# Patient Record
Sex: FEMALE | Race: WHITE
[De-identification: names, ages, dates, MRNs, and addresses within clinical notes are randomized per-mention and may not be internally consistent; named-entity substitution may affect disease eponyms.]

---

## 2019-07-01 ENCOUNTER — HOSPITAL ENCOUNTER (OUTPATIENT)
Dept: HOSPITAL 33 - OB | Age: 29
Setting detail: OBSERVATION
LOS: 1 days | Discharge: HOME | End: 2019-07-02
Attending: FAMILY MEDICINE | Admitting: FAMILY MEDICINE
Payer: COMMERCIAL

## 2019-07-01 DIAGNOSIS — Z34.83: Primary | ICD-10-CM

## 2019-07-01 LAB
AMPHETAMINES UR QL: NEGATIVE
BARBITURATES UR QL: NEGATIVE
BENZODIAZ UR QL SCN: NEGATIVE
COCAINE UR QL SCN: NEGATIVE
GLUCOSE UR-MCNC: NEGATIVE MG/DL
METHADONE UR QL: NEGATIVE
OPIATES UR QL: NEGATIVE
PCP UR QL CFM>20 NG/ML: NEGATIVE
PROT UR STRIP-MCNC: 100 MG/DL
RBC #/AREA URNS HPF: (no result) /HPF (ref 0–2)
THC UR QL SCN: NEGATIVE
WBC #/AREA URNS HPF: >100 /HPF (ref 0–5)

## 2019-07-01 PROCEDURE — 80307 DRUG TEST PRSMV CHEM ANLYZR: CPT

## 2019-07-01 PROCEDURE — G0378 HOSPITAL OBSERVATION PER HR: HCPCS

## 2019-07-01 PROCEDURE — 87077 CULTURE AEROBIC IDENTIFY: CPT

## 2019-07-01 PROCEDURE — 87186 SC STD MICRODIL/AGAR DIL: CPT

## 2019-07-01 PROCEDURE — 81001 URINALYSIS AUTO W/SCOPE: CPT

## 2019-07-01 PROCEDURE — 87086 URINE CULTURE/COLONY COUNT: CPT

## 2019-07-02 VITALS — DIASTOLIC BLOOD PRESSURE: 67 MMHG | HEART RATE: 86 BPM | SYSTOLIC BLOOD PRESSURE: 104 MMHG

## 2019-08-08 ENCOUNTER — HOSPITAL ENCOUNTER (OUTPATIENT)
Dept: HOSPITAL 33 - OB | Age: 29
Setting detail: OBSERVATION
Discharge: HOME | End: 2019-08-08
Attending: FAMILY MEDICINE | Admitting: FAMILY MEDICINE
Payer: COMMERCIAL

## 2019-08-08 VITALS — OXYGEN SATURATION: 99 % | HEART RATE: 103 BPM

## 2019-08-08 VITALS — SYSTOLIC BLOOD PRESSURE: 125 MMHG | DIASTOLIC BLOOD PRESSURE: 76 MMHG

## 2019-08-08 DIAGNOSIS — Z34.83: Primary | ICD-10-CM

## 2019-08-08 LAB
GLUCOSE UR-MCNC: NEGATIVE MG/DL
PROT UR STRIP-MCNC: 30 MG/DL
RBC #/AREA URNS HPF: (no result) /HPF (ref 0–2)
WBC #/AREA URNS HPF: (no result) /HPF (ref 0–5)

## 2019-08-08 PROCEDURE — 81001 URINALYSIS AUTO W/SCOPE: CPT

## 2019-08-08 PROCEDURE — G0378 HOSPITAL OBSERVATION PER HR: HCPCS

## 2019-08-12 ENCOUNTER — HOSPITAL ENCOUNTER (OUTPATIENT)
Dept: HOSPITAL 33 - OB | Age: 29
Setting detail: OBSERVATION
Discharge: HOME | End: 2019-08-12
Attending: FAMILY MEDICINE | Admitting: FAMILY MEDICINE
Payer: COMMERCIAL

## 2019-08-12 VITALS — SYSTOLIC BLOOD PRESSURE: 118 MMHG | HEART RATE: 81 BPM | DIASTOLIC BLOOD PRESSURE: 77 MMHG

## 2019-08-12 VITALS — OXYGEN SATURATION: 98 %

## 2019-08-12 DIAGNOSIS — Z34.83: Primary | ICD-10-CM

## 2019-08-12 PROCEDURE — G0378 HOSPITAL OBSERVATION PER HR: HCPCS

## 2019-08-12 PROCEDURE — 83986 ASSAY PH BODY FLUID NOS: CPT

## 2019-08-21 ENCOUNTER — HOSPITAL ENCOUNTER (OUTPATIENT)
Dept: HOSPITAL 33 - OB | Age: 29
Setting detail: OBSERVATION
Discharge: HOME | End: 2019-08-21
Attending: FAMILY MEDICINE | Admitting: FAMILY MEDICINE
Payer: COMMERCIAL

## 2019-08-21 VITALS — HEART RATE: 85 BPM

## 2019-08-21 VITALS — DIASTOLIC BLOOD PRESSURE: 84 MMHG | SYSTOLIC BLOOD PRESSURE: 121 MMHG

## 2019-08-21 DIAGNOSIS — Z34.83: Primary | ICD-10-CM

## 2019-08-21 LAB
GLUCOSE UR-MCNC: NEGATIVE MG/DL
PROT UR STRIP-MCNC: NEGATIVE MG/DL
RBC #/AREA URNS HPF: (no result) /HPF (ref 0–2)
WBC #/AREA URNS HPF: (no result) /HPF (ref 0–5)

## 2019-08-21 PROCEDURE — 81001 URINALYSIS AUTO W/SCOPE: CPT

## 2019-08-21 PROCEDURE — G0378 HOSPITAL OBSERVATION PER HR: HCPCS

## 2019-09-06 ENCOUNTER — HOSPITAL ENCOUNTER (INPATIENT)
Dept: HOSPITAL 33 - OB | Age: 29
LOS: 2 days | Discharge: HOME | End: 2019-09-08
Attending: FAMILY MEDICINE | Admitting: FAMILY MEDICINE
Payer: COMMERCIAL

## 2019-09-06 DIAGNOSIS — Z3A.39: ICD-10-CM

## 2019-09-06 LAB
AMPHETAMINES UR QL: NEGATIVE
BARBITURATES UR QL: NEGATIVE
BASOPHILS # BLD AUTO: 0.03 10*3/UL (ref 0–0.4)
BASOPHILS NFR BLD AUTO: 0.4 % (ref 0–0.4)
BENZODIAZ UR QL SCN: NEGATIVE
COCAINE UR QL SCN: NEGATIVE
EOSINOPHIL # BLD AUTO: 0.08 10*3/UL (ref 0–0.5)
GLUCOSE UR-MCNC: NEGATIVE MG/DL
GRANULOCYTES # BLD AUTO: 5.05 10*3/UL (ref 1.4–6.9)
HCT VFR BLD AUTO: 34.4 % (ref 35–47)
HGB BLD-MCNC: 11 GM/DL (ref 12–16)
LYMPHOCYTES # SPEC AUTO: 1.47 10*3/UL (ref 1–4.6)
MCH RBC QN AUTO: 25.2 PG (ref 26–32)
MCHC RBC AUTO-ENTMCNC: 32 G/DL (ref 32–36)
METHADONE UR QL: NEGATIVE
MONOCYTES # BLD AUTO: 0.5 10*3/UL (ref 0–1.3)
NEUTROPHILS NFR BLD AUTO: 70.9 % (ref 36–66)
OPIATES UR QL: NEGATIVE
PCP UR QL CFM>20 NG/ML: NEGATIVE
PLATELET # BLD AUTO: 197 K/MM3 (ref 150–450)
PROT UR STRIP-MCNC: NEGATIVE MG/DL
RBC # BLD AUTO: 4.36 M/MM3 (ref 4.1–5.4)
RBC #/AREA URNS HPF: (no result) /HPF (ref 0–2)
THC UR QL SCN: NEGATIVE
WBC # BLD AUTO: 7.1 K/MM3 (ref 4–10.5)
WBC #/AREA URNS HPF: (no result) /HPF (ref 0–5)

## 2019-09-06 PROCEDURE — 85025 COMPLETE CBC W/AUTO DIFF WBC: CPT

## 2019-09-06 PROCEDURE — 36415 COLL VENOUS BLD VENIPUNCTURE: CPT

## 2019-09-06 PROCEDURE — 81001 URINALYSIS AUTO W/SCOPE: CPT

## 2019-09-06 PROCEDURE — G0378 HOSPITAL OBSERVATION PER HR: HCPCS

## 2019-09-06 PROCEDURE — 87340 HEPATITIS B SURFACE AG IA: CPT

## 2019-09-06 PROCEDURE — 80307 DRUG TEST PRSMV CHEM ANLYZR: CPT

## 2019-09-06 RX ADMIN — THERA TABS SCH TAB: TAB at 21:39

## 2019-09-06 RX ADMIN — LEVOTHYROXINE SODIUM SCH: 100 TABLET ORAL at 21:33

## 2019-09-06 RX ADMIN — DOCUSATE SODIUM SCH MG: 100 CAPSULE, LIQUID FILLED ORAL at 21:39

## 2019-09-06 RX ADMIN — LEVOTHYROXINE SODIUM SCH: 75 TABLET ORAL at 21:33

## 2019-09-07 LAB
BASOPHILS # BLD AUTO: 0.01 10*3/UL (ref 0–0.4)
BASOPHILS NFR BLD AUTO: 0.1 % (ref 0–0.4)
EOSINOPHIL # BLD AUTO: 0.1 10*3/UL (ref 0–0.5)
GRANULOCYTES # BLD AUTO: 5.34 10*3/UL (ref 1.4–6.9)
HCT VFR BLD AUTO: 32.9 % (ref 35–47)
HGB BLD-MCNC: 10.4 GM/DL (ref 12–16)
LYMPHOCYTES # SPEC AUTO: 1.45 10*3/UL (ref 1–4.6)
MCH RBC QN AUTO: 25.2 PG (ref 26–32)
MCHC RBC AUTO-ENTMCNC: 31.6 G/DL (ref 32–36)
MONOCYTES # BLD AUTO: 0.49 10*3/UL (ref 0–1.3)
NEUTROPHILS NFR BLD AUTO: 72.3 % (ref 36–66)
PLATELET # BLD AUTO: 185 K/MM3 (ref 150–450)
RBC # BLD AUTO: 4.12 M/MM3 (ref 4.1–5.4)
WBC # BLD AUTO: 7.4 K/MM3 (ref 4–10.5)

## 2019-09-07 RX ADMIN — HYDROCODONE BITARTRATE AND ACETAMINOPHEN PRN TAB: 5; 325 TABLET ORAL at 18:59

## 2019-09-07 RX ADMIN — DOCUSATE SODIUM SCH MG: 100 CAPSULE, LIQUID FILLED ORAL at 10:45

## 2019-09-07 RX ADMIN — HYDROCODONE BITARTRATE AND ACETAMINOPHEN PRN TAB: 5; 325 TABLET ORAL at 10:43

## 2019-09-07 RX ADMIN — HYDROCODONE BITARTRATE AND ACETAMINOPHEN PRN TAB: 5; 325 TABLET ORAL at 05:55

## 2019-09-07 RX ADMIN — LEVOTHYROXINE SODIUM SCH MCG: 75 TABLET ORAL at 10:46

## 2019-09-07 RX ADMIN — LEVOTHYROXINE SODIUM SCH MCG: 100 TABLET ORAL at 10:46

## 2019-09-07 RX ADMIN — HYDROCODONE BITARTRATE AND ACETAMINOPHEN PRN TAB: 5; 325 TABLET ORAL at 15:08

## 2019-09-07 RX ADMIN — THERA TABS SCH TAB: TAB at 10:44

## 2019-09-07 RX ADMIN — Medication SCH MG: at 10:45

## 2019-09-07 RX ADMIN — HYDROCODONE BITARTRATE AND ACETAMINOPHEN PRN TAB: 5; 325 TABLET ORAL at 22:46

## 2019-09-07 RX ADMIN — IBUPROFEN PRN MG: 400 TABLET ORAL at 00:04

## 2019-09-07 RX ADMIN — HYDROCODONE BITARTRATE AND ACETAMINOPHEN PRN TAB: 5; 325 TABLET ORAL at 01:28

## 2019-09-07 RX ADMIN — DOCUSATE SODIUM SCH MG: 100 CAPSULE, LIQUID FILLED ORAL at 20:57

## 2019-09-08 VITALS — DIASTOLIC BLOOD PRESSURE: 64 MMHG | SYSTOLIC BLOOD PRESSURE: 112 MMHG | HEART RATE: 81 BPM

## 2019-09-08 RX ADMIN — HYDROCODONE BITARTRATE AND ACETAMINOPHEN PRN TAB: 5; 325 TABLET ORAL at 06:56

## 2019-09-08 RX ADMIN — LEVOTHYROXINE SODIUM SCH MCG: 75 TABLET ORAL at 09:39

## 2019-09-08 RX ADMIN — Medication SCH MG: at 09:39

## 2019-09-08 RX ADMIN — HYDROCODONE BITARTRATE AND ACETAMINOPHEN PRN TAB: 5; 325 TABLET ORAL at 03:03

## 2019-09-08 RX ADMIN — THERA TABS SCH TAB: TAB at 09:39

## 2019-09-08 RX ADMIN — DOCUSATE SODIUM SCH MG: 100 CAPSULE, LIQUID FILLED ORAL at 09:39

## 2019-09-08 RX ADMIN — IBUPROFEN PRN MG: 400 TABLET ORAL at 13:05

## 2019-09-08 RX ADMIN — LEVOTHYROXINE SODIUM SCH MCG: 100 TABLET ORAL at 09:39

## 2019-09-08 NOTE — PCM.DS
Discharge Summary


Date of Admission: 


19 08:46





Date of Discharge: 


2019





Admitting Physician: 


JIMMIE PRICE





Consults: 





 Consults on Case





19 17:03


Notify Physician ROUTINE 











Primary Care Provider: 


JIMMIE PRICE








Allergies


Allergies





cefprozil [From Cefzil] Allergy (Severe, Verified 19 05:36)


 Swelling of Face


coffee (Coffea arabica) [coffee] Allergy (Intermediate, Verified 19 05:37)


 [facility] Summary





- Hospital Course


Hospital Course: 


29 yr old  at 39 and 4/7 weeks ega that was admitted for elective 

induction of labor. Patient's PMHx includes hypothyroidism. Patient is s/p day 

two  of viable male infant. Perineum intact. Loose nuchal cord x1 easily 

reduced. Infant was bulb suctioned. Cord clamped then cut by FOB. Placenta 

delivered  by active management. Cord blood collective and sent to lab. Uterus 

contracted down and fundus firm.  ml. No complications noted. Patient 

has continued to do well during hospital course. She has had stable VS. She 

plans to bottle feed due to reported family hx of lactose intolerance. 








- Vitals & Intake/Output


Vital Signs: 





 Vital Signs











Temperature  98.2 F   19 08:00


 


Pulse Rate  77   19 08:00


 


Respiratory Rate  18   19 08:00


 


Blood Pressure  109/70   19 08:00


 


O2 Sat by Pulse Oximetry      











Intake & Output: 





 Intake & Output











 19





 11:59 11:59 11:59 11:59


 


Intake Total  1000 500 


 


Output Total 350 5  


 


Balance -350 995 500 


 


Weight 104.326 kg   














- Lab


Result Diagrams: 


 19 05:15








Discharge Exam


General Appearance: no apparent distress


Neurologic Exam: alert, oriented x 3, cooperative, normal mood/affect


Eye Exam: eyes nml inspection


Ears, Nose, Throat Exam: moist mucous membranes


Neck Exam: normal inspection


Respiratory Exam: normal breath sounds


Cardiovascular Exam: regular rate/rhythm, normal heart sounds


Gastrointestinal/Abdomen Exam: soft, normal bowel sounds


Pelvic Exam: other (Uterine fundus below level of umbilicus appropriately tender

)


Back Exam: normal inspection, other (small bruise at epidural site tender to 

palpation)


Extremity Exam: pedal edema (+2)


Skin Exam: normal color, warm, dry





Final Diagnosis/Problem List





- Final Discharge Diagnosis/Problem


(1) Vaginal delivery


Current Visit: No   Status: Acute   


Assessment & Plan: 


Patient is doing well. VS stable. Plan for discharge today








- Discharge


Discharge Date: 19


Disposition: Home, Self-Care


Condition: Stable


Prescriptions: 


No Action


   Prenatal Vits W-Ca,Fe,FA(<1Mg) [Prenatal] 1 each PO DAILY


   Levothyroxine Sodium [Synthroid] 275 mcg PO DAILY


   Ergocalciferol (Vitamin D2) [Vitamin D2] 50,000 unit PO Q7D


Follow up with: 


JIMMIE PRICE MD [Primary Care Provider] - 1 Week

## 2021-04-07 ENCOUNTER — HOSPITAL ENCOUNTER (OUTPATIENT)
Dept: HOSPITAL 33 - SDC | Age: 31
Discharge: HOME | End: 2021-04-07
Attending: FAMILY MEDICINE
Payer: COMMERCIAL

## 2021-04-07 VITALS — SYSTOLIC BLOOD PRESSURE: 126 MMHG | HEART RATE: 68 BPM | OXYGEN SATURATION: 99 % | DIASTOLIC BLOOD PRESSURE: 71 MMHG

## 2021-04-07 DIAGNOSIS — O02.1: Primary | ICD-10-CM

## 2021-04-07 LAB
ABO GROUP BLD: (no result)
HCT VFR BLD AUTO: 39.4 % (ref 35–47)
HGB BLD-MCNC: 12.5 GM/DL (ref 12–16)
RH BLD: POSITIVE

## 2021-04-07 PROCEDURE — 86850 RBC ANTIBODY SCREEN: CPT

## 2021-04-07 PROCEDURE — 36415 COLL VENOUS BLD VENIPUNCTURE: CPT

## 2021-04-07 PROCEDURE — 88305 TISSUE EXAM BY PATHOLOGIST: CPT

## 2021-04-07 PROCEDURE — 85014 HEMATOCRIT: CPT

## 2021-04-07 PROCEDURE — 86900 BLOOD TYPING SEROLOGIC ABO: CPT

## 2021-04-07 PROCEDURE — 85018 HEMOGLOBIN: CPT

## 2021-04-07 PROCEDURE — 86901 BLOOD TYPING SEROLOGIC RH(D): CPT

## 2021-04-07 NOTE — OP
SURGERY DATE/TIME:   2021  0755



PREOPERATIVE DIAGNOSIS:    Missed . 



POSTOPERATIVE DIAGNOSIS:  Missed . 



PROCEDURE:    Suction dilatation and curettage. 



SURGEON:      Goldy Negron M.D.



ANESTHESIA:  General by Apolinar Gates CRNA. 



ESTIMATED BLOOD LOSS:  Approximately 100 cc.



SPECIMEN:  Products of conception. 



DESCRIPTION OF PROCEDURE:  After informed, written consent was obtained, the patient was 
taken to the operating room. She was prepped and draped in dorsal lithotomy position after 
she underwent general anesthesia. A weighted speculum was inserted into the vagina vault 
and the anterior free edge of the cervix was grasped with a single tooth tenaculum. Sound 
was used to gauge sound depth and then Hegar dilators were used to approximate dilation to 
receive a 7 Japanese suction catheter.  A 7 Japanese suction catheter was used to evacuate the 
uterine cavity. There was tan tissue consistent with products of conception visualized 
during suction with some bleeding consistent with completion of the procedure. Several 
passes were used in all four quadrants and felt as though the endometrial gritty texture 
was appreciable following removal of the products of conception in all four quadrants with 
minimal bleeding following evacuation of the uterine cavity. The patient remained stable 
throughout the procedure. The single tooth tenaculum was removed. Sponge stick was used to 
clean the vaginal vault. Minimal bleeding was present following the procedure. The 
weighted speculum was removed. The patient was transferred to the recovery in good 
condition. She was advised to follow up in the office in one week and I also advised the 
patient and her mother before and after the procedure to call if any fever, severe pelvic 
pain, heavy bleeding with clot passage or other complaints or concerns prior to the one 
week martínez.

## 2021-04-07 NOTE — PCM.DCORD
- Discharge


Disposition: Home, Self-Care


Condition: Stable


Prescriptions: 


New


   Hydrocodone/Acetaminophen [Hydrocodone-Acetamin 5-325 mg ***] 1 tab PO Q6HPRN

PRN 5 Days #20 tablet MDD 4


     PRN Reason: Pain


   Cephalexin Mh 500 mg** [Keflex 500 mg**] 500 mg PO QID #28 capsule





Continue


   Prenatal Vits W-Ca,Fe,FA(<1Mg) [Prenatal] 1 each PO DAILY


   Levothyroxine Sodium [Synthroid] 225 mcg PO DAILY


   Ergocalciferol (Vitamin D2) [Vitamin D2] 50,000 unit PO Q7D


Follow up with: 


JIMMIE PRICE MD [Primary Care Provider] - 1 Week

## 2023-06-14 ENCOUNTER — HOSPITAL ENCOUNTER (OUTPATIENT)
Dept: HOSPITAL 33 - OB | Age: 33
Setting detail: OBSERVATION
Discharge: HOME | End: 2023-06-14
Attending: FAMILY MEDICINE | Admitting: FAMILY MEDICINE
Payer: COMMERCIAL

## 2023-06-14 VITALS — OXYGEN SATURATION: 98 %

## 2023-06-14 VITALS — HEART RATE: 82 BPM | SYSTOLIC BLOOD PRESSURE: 107 MMHG | DIASTOLIC BLOOD PRESSURE: 61 MMHG

## 2023-06-14 DIAGNOSIS — Z3A.34: ICD-10-CM

## 2023-06-14 DIAGNOSIS — Z34.83: Primary | ICD-10-CM

## 2023-06-14 LAB
AMPHETAMINES UR QL: NEGATIVE
BACTERIA UR CULT: NO
BARBITURATES UR QL: NEGATIVE
BENZODIAZ UR QL SCN: NEGATIVE
COCAINE UR QL SCN: NEGATIVE
METHADONE UR QL: NEGATIVE
OPIATES UR QL: NEGATIVE
PCP UR QL CFM>20 NG/ML: NEGATIVE
RBC # URNS HPF: (no result) /HPF (ref 0–5)
THC UR QL SCN: NEGATIVE
WBC URNS QL MICRO: (no result) /HPF (ref 0–5)

## 2023-06-14 PROCEDURE — 81001 URINALYSIS AUTO W/SCOPE: CPT

## 2023-06-14 PROCEDURE — G0379 DIRECT REFER HOSPITAL OBSERV: HCPCS

## 2023-06-14 PROCEDURE — 80307 DRUG TEST PRSMV CHEM ANLYZR: CPT

## 2023-06-14 PROCEDURE — G0378 HOSPITAL OBSERVATION PER HR: HCPCS

## 2023-07-03 ENCOUNTER — HOSPITAL ENCOUNTER (INPATIENT)
Dept: HOSPITAL 33 - CLIN-LAKE | Age: 33
LOS: 3 days | Discharge: HOME | End: 2023-07-06
Attending: FAMILY MEDICINE | Admitting: FAMILY MEDICINE
Payer: COMMERCIAL

## 2023-07-03 DIAGNOSIS — Z20.828: ICD-10-CM

## 2023-07-03 DIAGNOSIS — K83.1: ICD-10-CM

## 2023-07-03 DIAGNOSIS — Z3A.37: ICD-10-CM

## 2023-07-03 DIAGNOSIS — O26.613: Primary | ICD-10-CM

## 2023-07-03 LAB
ABO GROUP BLD: (no result)
AMPHETAMINES UR QL: NEGATIVE
BARBITURATES UR QL: NEGATIVE
BENZODIAZ UR QL SCN: NEGATIVE
COCAINE UR QL SCN: NEGATIVE
GLUCOSE UR-MCNC: NEGATIVE MG/DL
HCT VFR BLD AUTO: 30.2 % (ref 35–47)
HGB BLD-MCNC: 9.2 G/DL (ref 12–16)
MCH RBC QN AUTO: 23.2 PG (ref 26–32)
MCHC RBC AUTO-ENTMCNC: 30.5 G/DL (ref 32–36)
METHADONE UR QL: NEGATIVE
OPIATES UR QL: NEGATIVE
PCP UR QL CFM>20 NG/ML: NEGATIVE
PLATELET # BLD AUTO: 179 X10^3/UL (ref 150–450)
PROT UR STRIP-MCNC: 30 MG/DL
RBC # BLD AUTO: 3.96 X10^6/UL (ref 4.1–5.4)
RBC # UR AUTO: NEGATIVE ERY/UL (ref 0–5)
RH BLD: POSITIVE
THC UR QL SCN: NEGATIVE
WBC # BLD AUTO: 4.8 X10^3/UL (ref 4–10.5)

## 2023-07-03 PROCEDURE — 85027 COMPLETE CBC AUTOMATED: CPT

## 2023-07-03 PROCEDURE — 86900 BLOOD TYPING SEROLOGIC ABO: CPT

## 2023-07-03 PROCEDURE — 90715 TDAP VACCINE 7 YRS/> IM: CPT

## 2023-07-03 PROCEDURE — 86901 BLOOD TYPING SEROLOGIC RH(D): CPT

## 2023-07-03 PROCEDURE — 86850 RBC ANTIBODY SCREEN: CPT

## 2023-07-03 PROCEDURE — 85025 COMPLETE CBC W/AUTO DIFF WBC: CPT

## 2023-07-03 PROCEDURE — 36415 COLL VENOUS BLD VENIPUNCTURE: CPT

## 2023-07-03 PROCEDURE — 81003 URINALYSIS AUTO W/O SCOPE: CPT

## 2023-07-03 PROCEDURE — 96372 THER/PROPH/DIAG INJ SC/IM: CPT

## 2023-07-03 PROCEDURE — G0378 HOSPITAL OBSERVATION PER HR: HCPCS

## 2023-07-03 PROCEDURE — 80053 COMPREHEN METABOLIC PANEL: CPT

## 2023-07-03 PROCEDURE — 80307 DRUG TEST PRSMV CHEM ANLYZR: CPT

## 2023-07-03 RX ADMIN — Medication SCH MLS/HR: at 16:27

## 2023-07-04 RX ADMIN — IBUPROFEN PRN MG: 400 TABLET ORAL at 16:36

## 2023-07-04 RX ADMIN — ACETAMINOPHEN PRN MG: 500 TABLET ORAL at 22:33

## 2023-07-04 RX ADMIN — Medication SCH: at 11:09

## 2023-07-04 RX ADMIN — Medication SCH MLS/HR: at 09:36

## 2023-07-04 RX ADMIN — DOCUSATE SODIUM SCH MG: 100 CAPSULE, LIQUID FILLED ORAL at 23:20

## 2023-07-05 LAB
HCT VFR BLD AUTO: 26.6 % (ref 35–47)
HGB BLD-MCNC: 8.3 G/DL (ref 12–16)
MCH RBC QN AUTO: 23.6 PG (ref 26–32)
MCHC RBC AUTO-ENTMCNC: 31.2 G/DL (ref 32–36)
PLATELET # BLD AUTO: 191 X10^3/UL (ref 150–450)
RBC # BLD AUTO: 3.51 X10^6/UL (ref 4.1–5.4)
WBC # BLD AUTO: 7 X10^3/UL (ref 4–10.5)

## 2023-07-05 RX ADMIN — Medication SCH MG: at 09:42

## 2023-07-05 RX ADMIN — ACETAMINOPHEN PRN MG: 500 TABLET ORAL at 20:00

## 2023-07-05 RX ADMIN — ACETAMINOPHEN PRN MG: 500 TABLET ORAL at 13:19

## 2023-07-05 RX ADMIN — DOCUSATE SODIUM SCH MG: 100 CAPSULE, LIQUID FILLED ORAL at 21:22

## 2023-07-05 RX ADMIN — LEVOTHYROXINE SODIUM SCH MCG: 125 TABLET ORAL at 11:32

## 2023-07-05 RX ADMIN — IBUPROFEN PRN MG: 400 TABLET ORAL at 09:39

## 2023-07-05 RX ADMIN — LEVOTHYROXINE SODIUM SCH MCG: 150 TABLET ORAL at 11:32

## 2023-07-05 RX ADMIN — THERA TABS SCH TAB: TAB at 13:22

## 2023-07-05 RX ADMIN — IBUPROFEN PRN MG: 400 TABLET ORAL at 15:40

## 2023-07-05 RX ADMIN — IBUPROFEN PRN MG: 400 TABLET ORAL at 21:40

## 2023-07-05 RX ADMIN — IBUPROFEN PRN MG: 400 TABLET ORAL at 01:08

## 2023-07-05 RX ADMIN — ACETAMINOPHEN PRN MG: 500 TABLET ORAL at 04:02

## 2023-07-05 RX ADMIN — DOCUSATE SODIUM SCH MG: 100 CAPSULE, LIQUID FILLED ORAL at 09:41

## 2023-07-06 VITALS — HEART RATE: 78 BPM | SYSTOLIC BLOOD PRESSURE: 125 MMHG | DIASTOLIC BLOOD PRESSURE: 74 MMHG

## 2023-07-06 VITALS — OXYGEN SATURATION: 99 %

## 2023-07-06 LAB
ALBUMIN SERPL-MCNC: 3 G/DL (ref 3.5–5)
ALP SERPL-CCNC: 262 U/L (ref 38–126)
ALT SERPL-CCNC: 62 U/L (ref 0–35)
ANION GAP SERPL CALC-SCNC: 10.4 MEQ/L (ref 5–15)
AST SERPL QL: 45 U/L (ref 14–36)
BASOPHILS # BLD AUTO: 0.04 X10^3/UL (ref 0–0.4)
BASOPHILS NFR BLD AUTO: 0.7 % (ref 0–0.4)
BILIRUB BLD-MCNC: 0.5 MG/DL (ref 0.2–1.3)
BUN SERPL-MCNC: 6 MG/DL (ref 7–17)
CALCIUM SPEC-MCNC: 8.2 MG/DL (ref 8.4–10.2)
CHLORIDE SERPL-SCNC: 105 MMOL/L (ref 98–107)
CO2 SERPL-SCNC: 24 MMOL/L (ref 22–30)
CREAT SERPL-MCNC: 0.64 MG/DL (ref 0.52–1.04)
EOSINOPHIL # BLD AUTO: 0.11 X10^3/UL (ref 0–0.5)
GFR SERPLBLD BASED ON 1.73 SQ M-ARVRAT: > 60 ML/MIN
GLUCOSE SERPL-MCNC: 96 MG/DL (ref 74–106)
HCT VFR BLD AUTO: 28.6 % (ref 35–47)
HGB BLD-MCNC: 8.5 G/DL (ref 12–16)
IMM GRANULOCYTES # BLD: 0.09 X10^3U/L (ref 0–0.03)
IMM GRANULOCYTES NFR BLD: 1.5 % (ref 0–0.4)
LYMPHOCYTES # SPEC AUTO: 1.17 X10^3/UL (ref 1–4.6)
MCH RBC QN AUTO: 23 PG (ref 26–32)
MCHC RBC AUTO-ENTMCNC: 29.7 G/DL (ref 32–36)
MONOCYTES # BLD AUTO: 0.5 X10^3/UL (ref 0–1.3)
NRBC # BLD AUTO: 0 X10^3U/L (ref 0–0.01)
NRBC BLD AUTO-RTO: 0 % (ref 0–0.1)
PLATELET # BLD AUTO: 223 X10^3/UL (ref 150–450)
POTASSIUM SERPLBLD-SCNC: 3.4 MMOL/L (ref 3.5–5.1)
PROT SERPL-MCNC: 6.2 G/DL (ref 6.3–8.2)
RBC # BLD AUTO: 3.69 X10^6/UL (ref 4.1–5.4)
SODIUM SERPL-SCNC: 136 MMOL/L (ref 137–145)
WBC # BLD AUTO: 6 X10^3/UL (ref 4–10.5)

## 2023-07-06 RX ADMIN — IBUPROFEN PRN MG: 400 TABLET ORAL at 04:27

## 2023-07-06 RX ADMIN — ACETAMINOPHEN PRN MG: 500 TABLET ORAL at 04:26

## 2023-07-06 RX ADMIN — LEVOTHYROXINE SODIUM SCH MCG: 150 TABLET ORAL at 10:34

## 2023-07-06 RX ADMIN — LEVOTHYROXINE SODIUM SCH MCG: 125 TABLET ORAL at 10:34

## 2023-07-06 RX ADMIN — Medication SCH MG: at 10:33

## 2023-07-06 RX ADMIN — DOCUSATE SODIUM SCH MG: 100 CAPSULE, LIQUID FILLED ORAL at 10:33

## 2023-07-06 RX ADMIN — THERA TABS SCH TAB: TAB at 10:33

## 2023-07-06 NOTE — PCM.DS
Discharge Summary


Date of Admission: 


23 08:25





Admitting Physician: 


JIMMIE PRICE





Consults: 





                                Consults on Case





23 20:21


 Navigation ONCE 











Primary Care Provider: 


JIMMIE PRICE








Allergies


Allergies





cefprozil [From Cefzil] Allergy (Severe, Verified 23 14:36)


   Swelling of Face


Penicillins Allergy (Severe, Verified 23 14:36)


   Hives


   vomiting  


coffee (Coffea arabica) [coffee] Allergy (Intermediate, Verified 23 14:36)


   Hives


Eggs Allergy (Severe, Uncoded 23 18:59)


   Vomiting


   Diarrhea 











Hospital Summary





- Hospital Course


Hospital Course: 





patient induced at 37wks for cholestasis, uneventful vaginal delivery with no 

complications. doing well postpartum, breastfeeding and well bonded with infant





- Vitals & Intake/Output


Vital Signs: 





                                   Vital Signs











Temperature  98.1 F   23 04:00


 


Pulse Rate  80   23 04:00


 


Respiratory Rate  18   23 04:00


 


Blood Pressure  118/69   23 04:00


 


O2 Sat by Pulse Oximetry  99   23 04:00











Intake & Output: 





                                 Intake & Output











 23





 11:59 11:59 11:59 11:59


 


Intake Total  1275 250 200


 


Output Total  1150 1500 


 


Balance  125 -1250 200


 


Weight  104.326 kg  














- Lab


Result Diagrams: 


                                 23 04:15





                                 23 04:15


Lab Results-Last 24 Hrs: 





                            Lab Results-Last 24 Hours











  23 Range/Units





  04:15 04:15 


 


WBC  6.0   (4.0-10.5)  x10^3/uL


 


RBC  3.69 L   (4.1-5.4)  x10^6/uL


 


Hgb  8.5 L   (12.0-16.0)  g/dL


 


Hct  28.6 L   (35-47)  %


 


MCV  77.5 L   ()  fL


 


MCH  23.0 L   (26-32)  pg


 


MCHC  29.7 L   (32-36)  g/dL


 


RDW  14.6 H   (11.5-14.0)  %


 


Plt Count  223   (150-450)  x10^3/uL


 


MPV  11.6 H   (7.5-11.0)  fL


 


Gran %  68.2 H   (36.0-66.0)  %


 


Immature Gran % (Auto)  1.5 H   (0.00-0.4)  %


 


Nucleat RBC Rel Count  0.0   (0.00-0.1)  %


 


Eos # (Auto)  0.11   (0-0.5)  x10^3/uL


 


Immature Gran # (Auto)  0.09 H   (0.00-0.03)  x10^3u/L


 


Absolute Lymphs (auto)  1.17   (1.0-4.6)  x10^3/uL


 


Absolute Monos (auto)  0.50   (0.0-1.3)  x10^3/uL


 


Absolute Nucleated RBC  0.00   (0.00-0.01)  x10^3u/L


 


Lymphocytes %  19.5 L   (24.0-44.0)  %


 


Monocytes %  8.3   (0.0-12.0)  %


 


Eosinophils %  1.8   (0.00-5.0)  %


 


Basophils %  0.7   (0.0-0.4)  %


 


Absolute Granulocytes  4.10   (1.4-6.9)  x10^3/uL


 


Basophils #  0.04   (0-0.4)  x10^3/uL


 


Sodium   136 L  (137-145)  mmol/L


 


Potassium   3.4 L  (3.5-5.1)  mmol/L


 


Chloride   105  ()  mmol/L


 


Carbon Dioxide   24  (22-30)  mmol/L


 


Anion Gap   10.4  (5-15)  MEQ/L


 


BUN   6 L  (7-17)  mg/dL


 


Creatinine   0.64  (0.52-1.04)  mg/dL


 


Estimated GFR   > 60.0  ML/MIN


 


Glucose   96  ()  mg/dL


 


Calcium   8.2 L  (8.4-10.2)  mg/dL


 


Total Bilirubin   0.50  (0.2-1.3)  mg/dL


 


AST   45 H  (14-36)  U/L


 


ALT   62 H  (0-35)  U/L


 


Alkaline Phosphatase   262 H  ()  U/L


 


Serum Total Protein   6.2 L  (6.3-8.2)  g/dL


 


Albumin   3.0 L  (3.5-5.0)  g/dL














Discharge Exam


General Appearance: no apparent distress


Neurologic Exam: alert, oriented x 3


Respiratory Exam: normal breath sounds, lungs clear, No respiratory distress


Cardiovascular Exam: regular rate/rhythm, normal heart sounds


Gastrointestinal/Abdomen Exam: soft, other (fundus firm), No tenderness, No mass





Final Diagnosis/Problem List





- Final Discharge Diagnosis/Problem


(1) Vaginal delivery


Current Visit: No   Status: Acute   





(2) Cholestasis during pregnancy in third trimester


Current Visit: Yes   Status: Acute   Code(s): O26.613 - LIVER AND BILIARY TRACT 

DISORD IN PREGNANCY, THIRD TRIMESTER; K83.1 - OBSTRUCTION OF BILE DUCT   





(3) Breastfeeding (infant)


Current Visit: Yes   Status: Acute   Code(s): Z78.9 - OTHER SPECIFIED HEALTH 

STATUS   





- Discharge


Disposition: Home, Self-Care


Condition: Stable


Prescriptions: 


Continue


   Prenatal Vits W-Ca,Fe,FA(<1Mg) [Prenatal] 1 each PO DAILY


   Ergocalciferol (Vitamin D2) [Vitamin D2] 50,000 unit PO Q7D


   Ferrous Sulfate 325 mg*** [Feosol 325 mg***] 325 mg PO DAILY


   Levothyroxine Sodium 100 Mcg** [Synthroid 100 Mcg***] 275 mcg PO QAM


Follow up with: 


JIMMIE PRICE MD [Primary Care Provider] -

## 2023-09-18 ENCOUNTER — HOSPITAL ENCOUNTER (OUTPATIENT)
Dept: HOSPITAL 33 - SDC | Age: 33
Discharge: HOME | End: 2023-09-18
Attending: SURGERY
Payer: COMMERCIAL

## 2023-09-18 VITALS — OXYGEN SATURATION: 99 %

## 2023-09-18 VITALS — SYSTOLIC BLOOD PRESSURE: 117 MMHG | DIASTOLIC BLOOD PRESSURE: 74 MMHG | HEART RATE: 55 BPM | TEMPERATURE: 97.1 F

## 2023-09-18 VITALS — RESPIRATION RATE: 16 BRPM

## 2023-09-18 DIAGNOSIS — Z30.2: Primary | ICD-10-CM

## 2023-09-18 LAB — HCG UR QL: NEGATIVE

## 2023-09-18 PROCEDURE — 81025 URINE PREGNANCY TEST: CPT

## 2023-09-18 NOTE — HP
DATE OF SURGERY:  09/18/2023 



HISTORY OF PRESENT ILLNESS:  The patient is a 33-year-old with history of hypothyroidism, 
had mastitis and on erythromycin recently. She has four children, no longer desires 
fertility and desires tubal. 



PAST MEDICAL HISTORY:  



PAST SURGICAL HISTORY:  LEEP. D&C. 



MEDICATIONS:  Azithromycin for mastitis in the past. Vitamin D2, prenatal tablets, ferrous 
sulfate, levothyroxine for hypothyroidism.



ALLERGIES:  AMOXICILLIN.  PENICILLIN. CEFZIL. EGGS. 

  

FAMILY HISTORY:  Hypothyroidism, hypertension, diabetes. 



SOCIAL HISTORY:  Denies smoking. No alcohol abuse. 



REVIEW OF SYSTEMS:  Fourteen systems reviewed pertinent for mastitis and hypothyroidism.  
Otherwise no chest pain or palpitations. Other systems negative or noncontributory as 
above and per preadmission questionnaire. 



PHYSICAL EXAMINATION:  Height 5'6".  BMI 35. 

GENERAL:  No acute distress.

HEENT: Sclerae nonicteric. EOMI. Oral mucous membranes moist. 

NECK:  No JVD.

CHEST: Equal excursion, nonlabored breathing. 

CVS:  Regular rate and rhythm. 

ABDOMEN:  Soft. No peritoneal signs.   

EXTREMITIES:  No significant edema.

NEURO:  Alert, oriented, moving extremities symmetrically.   

PSYCH: Appropriate mood and affect.  

SKIN:  Dry.



IMPRESSION:  Undesired fertility. The patient desires laparoscopic bilateral tubal 
cauterization possible open. General risk bleeding or infection, risk of trocar injury or 
hernia, risk of bladder or blood vessel injury, risk of tubal failure rate, risk of 
bladder, blood vessel or ureter issues or injury but not limited to. She understands and 
agrees to the planned procedure as well as 4% risk of tubal failure possibly requiring 
other procedures. She understands she needs to use other form of contraceptive for six 
weeks postoperatively. Will proceed with laparoscopic bilateral tubal cauterization 
possible open.

## 2023-09-19 NOTE — OP
SURGERY DATE/TIME:  09/18/2023  1140     



PREOPERATIVE DIAGNOSIS:     Undesired fertility, need for bilateral tubal cauterization.



POSTOPERATIVE DIAGNOSIS:     Undesired fertility, need for bilateral tubal cauterization.



PROCEDURE:    Laparoscopic bilateral tubal cauterization. 



SURGEON:        Dr. Fausto Quesada.



ANESTHESIA:    General.



ESTIMATED BLOOD LOSS:    Minimal.    



INDICATIONS:  As noted above. Risks and benefits explained in detail and not limited to 
and consent obtained.

     

DESCRIPTION OF PROCEDURE AND FINDINGS:  The patient is taken to the operating room. 
General anesthesia was introduced. Abdomen prepped and draped in usual sterile fashion. 
After official time out and no disagreement with planned procedure, transverse incision 
made at supraumbilical area. Fascia grasped and pulled upwards. Veress needle inserted and 
tested with saline. Pneumoperitoneum accomplished. Insufflating opening pressure 0 to 15.  
A 5 mm bladeless port and camera were inserted without difficulty, 5 mm left lower 
quadrant, 5 mm right upper quadrant, 5 mm ports. The tube was elevated upwards with a 
grasper and original Kleppinger was nonfunctional requiring a secondary Kleppinger. 
Bipolar worked quite well. The tube carefully stretched out and was cauterized over 3.5 to 
4 cm segment on either side elevating it well away from the retroperitoneum and side wall 
taking the energy down to 0 ohms and no resistance at this location. There was minimal 
ooze from the uterine fundus. Grasping was able to easily stretch the tube out for 
cauterization. Irrigation irrigated clear. The patient tolerated the procedure well. There 
was no family out in the waiting area. I will see her back in the office next week.